# Patient Record
Sex: FEMALE | Employment: PART TIME | URBAN - METROPOLITAN AREA
[De-identification: names, ages, dates, MRNs, and addresses within clinical notes are randomized per-mention and may not be internally consistent; named-entity substitution may affect disease eponyms.]

---

## 2018-06-05 ENCOUNTER — OFFICE VISIT (OUTPATIENT)
Dept: OPTOMETRY | Facility: CLINIC | Age: 53
End: 2018-06-05
Payer: COMMERCIAL

## 2018-06-05 DIAGNOSIS — H52.4 MYOPIA WITH PRESBYOPIA OF BOTH EYES: Primary | ICD-10-CM

## 2018-06-05 DIAGNOSIS — H57.89 REDNESS OF BOTH EYES: Primary | ICD-10-CM

## 2018-06-05 DIAGNOSIS — H52.13 MYOPIA WITH PRESBYOPIA OF BOTH EYES: Primary | ICD-10-CM

## 2018-06-05 PROCEDURE — 92004 COMPRE OPH EXAM NEW PT 1/>: CPT | Mod: S$GLB,,, | Performed by: OPTOMETRIST

## 2018-06-05 PROCEDURE — 92310 CONTACT LENS FITTING OU: CPT | Mod: ,,, | Performed by: OPTOMETRIST

## 2018-06-05 PROCEDURE — 99999 PR PBB SHADOW E&M-EST. PATIENT-LVL II: CPT | Mod: PBBFAC,,, | Performed by: OPTOMETRIST

## 2018-06-05 PROCEDURE — 99499 UNLISTED E&M SERVICE: CPT | Mod: S$GLB,,, | Performed by: OPTOMETRIST

## 2018-06-05 RX ORDER — SIMVASTATIN 20 MG/1
TABLET, FILM COATED ORAL
Refills: 1 | COMMUNITY
Start: 2018-04-29

## 2018-06-05 RX ORDER — UBIDECARENONE 30 MG
30 CAPSULE ORAL 3 TIMES DAILY
COMMUNITY

## 2018-06-05 RX ORDER — ASPIRIN 81 MG/1
81 TABLET ORAL DAILY
COMMUNITY
End: 2019-09-18

## 2018-06-05 RX ORDER — FOLIC ACID 0.8 MG
800 TABLET ORAL DAILY
COMMUNITY
End: 2019-09-18

## 2018-06-05 NOTE — LETTER
June 5, 2018      Ruddy Anderson MD  [address]  [address]  [facility] [address]           [facility]  [address]  [place] [address]  Phone: [phone]  Fax: [phone]          Patient: Manjinder Fischer   MR Number: [id number]   YOB: 1965   Date of Visit: 6/5/2018       Dear Dr. Ruddy Anderson:    Thank you for referring Manjinder Fischer to me for evaluation. Attached you will find relevant portions of my assessment and plan of care.    If you have questions, please do not hesitate to call me. I look forward to following Manjinder Fishcer along with you.    Sincerely,    Massiel Harrell, OD    Enclosure  CC:  No Recipients    If you would like to receive this communication electronically, please contact [email or web address] or [phone] to request more information on [employer] Link access.    For providers and/or their staff who would like to refer a patient to [facility], please contact us through our one-stop-shop provider referral line, [facility], at [phone].    If you feel you have received this communication in error or would no longer like to receive these types of communications, please e-mail [email or web address]

## 2018-06-05 NOTE — PROGRESS NOTES
HPI     Patient's last dilated exam was: apprx 4-5 months ago W/ Dr. Morataya at   [facility].    Was also seen 1/18/18 W/ Dr. Bethea     Pt here for routine eye exam w/ ctl fu. Would like to update her glasses   and contacts. C/O waking up and her eyes are very red.Throughout the day   but worse in the morning.  Has tried otc allergy gtts and At's, has not   used in a while. Denies itching, tearing or FB sensation. Says her eyes do   not feel dry. Patient denies flashes, floaters, pain and double vision.   Has been wearing Acuvue 2 for apprx 10+ years. Has hx of LASIK OU  apprx   20 years ago     Last edited by Mari Bergman, PCT on 6/5/2018 11:06 AM.   (History)            Assessment /Plan     For exam results, see Encounter Report.    Redness of both eyes            1.  Overall eye health normal.  Feel that redness caused by dryness and contact lens material.  Refit patient with monovision Total1 dailies.  Recommend artificial tears at least 2x/day OU.  RTC 1 week for cl check.

## 2018-06-05 NOTE — LETTER
June 5, 2018      Ruddy Anderson MD  [address]  [address]  [facility] [address]           [facility]  [address]  [place] [address]  Phone: [phone]  Fax: [phone]          Patient: Manjinder Fischer   MR Number: [id number]   YOB: 1965   Date of Visit: 6/5/2018       Dear Dr. Ruddy Anderson:    Thank you for referring Manjinder Fischer to me for evaluation. Attached you will find relevant portions of my assessment and plan of care.    If you have questions, please do not hesitate to call me. I look forward to following Manjinder Fischer along with you.    Sincerely,    Massiel Harrell, OD    Enclosure  CC:  No Recipients    If you would like to receive this communication electronically, please contact [email or web address] or [phone] to request more information on [employer] Link access.    For providers and/or their staff who would like to refer a patient to [facility], please contact us through our one-stop-shop provider referral line, [facility] [place], at [phone].    If you feel you have received this communication in error or would no longer like to receive these types of communications, please e-mail [email or web address]

## 2018-06-12 ENCOUNTER — OFFICE VISIT (OUTPATIENT)
Dept: OPTOMETRY | Facility: CLINIC | Age: 53
End: 2018-06-12
Payer: COMMERCIAL

## 2018-06-12 DIAGNOSIS — H52.13 MYOPIA WITH PRESBYOPIA OF BOTH EYES: ICD-10-CM

## 2018-06-12 DIAGNOSIS — H52.4 MYOPIA WITH PRESBYOPIA OF BOTH EYES: ICD-10-CM

## 2018-06-12 DIAGNOSIS — H57.89 REDNESS OF BOTH EYES: Primary | ICD-10-CM

## 2018-06-12 PROCEDURE — 92499 UNLISTED OPH SVC/PROCEDURE: CPT | Mod: ,,, | Performed by: OPTOMETRIST

## 2018-06-12 NOTE — PROGRESS NOTES
HPI     Patient's last eye exam was 6/5/2018 W/ Dr. Harrell    Pt here for ctl fu. Feels she is seeing very hazy for distance with her   contacts. Happens almost instantly, throughout the day, every day. Wears   her lenses between 8-15 hours. Feels the blurriness is due to light, worse   when she is in bright lights. Very happy with her vision for reading.   Patient denies flashes, floaters, pain and double vision.     Last edited by BRADLY Jennings on 6/12/2018  9:57 AM.   (History)            Assessment /Plan     For exam results, see Encounter Report.    Redness of both eyes    Myopia with presbyopia of both eyes            1.  Redness improved since discontinuing use of Acuvue 2s.  2.  Dispensed toric daily to see if this will improve haziness.  Ok for patient to call in preference.

## 2018-06-18 ENCOUNTER — PATIENT MESSAGE (OUTPATIENT)
Dept: OPTOMETRY | Facility: CLINIC | Age: 53
End: 2018-06-18

## 2018-11-14 ENCOUNTER — TELEPHONE (OUTPATIENT)
Dept: OPTOMETRY | Facility: CLINIC | Age: 53
End: 2018-11-14

## 2019-01-02 ENCOUNTER — HOSPITAL ENCOUNTER (EMERGENCY)
Facility: HOSPITAL | Age: 54
Discharge: HOME | End: 2019-01-02
Attending: EMERGENCY MEDICINE
Payer: COMMERCIAL

## 2019-01-02 VITALS
SYSTOLIC BLOOD PRESSURE: 164 MMHG | HEART RATE: 66 BPM | RESPIRATION RATE: 18 BRPM | WEIGHT: 130 LBS | OXYGEN SATURATION: 100 % | TEMPERATURE: 98 F | DIASTOLIC BLOOD PRESSURE: 74 MMHG

## 2019-01-02 DIAGNOSIS — N23 URETERAL COLIC: Primary | ICD-10-CM

## 2019-01-02 LAB
ANION GAP SERPL CALC-SCNC: 12 MMOL/L
B-HCG UR QL: NEGATIVE
BACTERIA #/AREA URNS AUTO: ABNORMAL /HPF
BASOPHILS # BLD AUTO: 0.05 K/UL
BASOPHILS NFR BLD: 0.5 %
BILIRUB UR QL STRIP: NEGATIVE
BUN SERPL-MCNC: 20 MG/DL
CALCIUM SERPL-MCNC: 9.9 MG/DL
CHLORIDE SERPL-SCNC: 105 MMOL/L
CLARITY UR REFRACT.AUTO: ABNORMAL
CO2 SERPL-SCNC: 24 MMOL/L
COLOR UR AUTO: YELLOW
CREAT SERPL-MCNC: 0.8 MG/DL
CTP QC/QA: YES
DIFFERENTIAL METHOD: ABNORMAL
EOSINOPHIL # BLD AUTO: 0.1 K/UL
EOSINOPHIL NFR BLD: 1.1 %
ERYTHROCYTE [DISTWIDTH] IN BLOOD BY AUTOMATED COUNT: 13.8 %
EST. GFR  (AFRICAN AMERICAN): >60 ML/MIN/1.73 M^2
EST. GFR  (NON AFRICAN AMERICAN): >60 ML/MIN/1.73 M^2
GLUCOSE SERPL-MCNC: 137 MG/DL
GLUCOSE UR QL STRIP: NEGATIVE
HCT VFR BLD AUTO: 40.7 %
HGB BLD-MCNC: 13.7 G/DL
HGB UR QL STRIP: ABNORMAL
IMM GRANULOCYTES # BLD AUTO: 0.04 K/UL
IMM GRANULOCYTES NFR BLD AUTO: 0.4 %
KETONES UR QL STRIP: ABNORMAL
LEUKOCYTE ESTERASE UR QL STRIP: NEGATIVE
LYMPHOCYTES # BLD AUTO: 2.2 K/UL
LYMPHOCYTES NFR BLD: 19.7 %
MCH RBC QN AUTO: 30.1 PG
MCHC RBC AUTO-ENTMCNC: 33.7 G/DL
MCV RBC AUTO: 90 FL
MICROSCOPIC COMMENT: ABNORMAL
MONOCYTES # BLD AUTO: 0.8 K/UL
MONOCYTES NFR BLD: 7.5 %
NEUTROPHILS # BLD AUTO: 7.8 K/UL
NEUTROPHILS NFR BLD: 70.8 %
NITRITE UR QL STRIP: NEGATIVE
NRBC BLD-RTO: 0 /100 WBC
PH UR STRIP: 7 [PH] (ref 5–8)
PLATELET # BLD AUTO: 261 K/UL
PMV BLD AUTO: 10.7 FL
POTASSIUM SERPL-SCNC: 3.9 MMOL/L
PROT UR QL STRIP: NEGATIVE
RBC # BLD AUTO: 4.55 M/UL
RBC #/AREA URNS AUTO: >100 /HPF (ref 0–4)
SODIUM SERPL-SCNC: 141 MMOL/L
SP GR UR STRIP: 1.01 (ref 1–1.03)
SQUAMOUS #/AREA URNS AUTO: 1 /HPF
URN SPEC COLLECT METH UR: ABNORMAL
WBC # BLD AUTO: 10.99 K/UL
WBC #/AREA URNS AUTO: 1 /HPF (ref 0–5)

## 2019-01-02 PROCEDURE — 81001 URINALYSIS AUTO W/SCOPE: CPT

## 2019-01-02 PROCEDURE — 99284 EMERGENCY DEPT VISIT MOD MDM: CPT | Mod: 25

## 2019-01-02 PROCEDURE — 80048 BASIC METABOLIC PNL TOTAL CA: CPT

## 2019-01-02 PROCEDURE — 96375 TX/PRO/DX INJ NEW DRUG ADDON: CPT | Mod: 59

## 2019-01-02 PROCEDURE — 96374 THER/PROPH/DIAG INJ IV PUSH: CPT

## 2019-01-02 PROCEDURE — 99284 PR EMERGENCY DEPT VISIT,LEVEL IV: ICD-10-PCS | Mod: ,,, | Performed by: PHYSICIAN ASSISTANT

## 2019-01-02 PROCEDURE — 81025 URINE PREGNANCY TEST: CPT | Performed by: EMERGENCY MEDICINE

## 2019-01-02 PROCEDURE — 85025 COMPLETE CBC W/AUTO DIFF WBC: CPT

## 2019-01-02 PROCEDURE — 96361 HYDRATE IV INFUSION ADD-ON: CPT

## 2019-01-02 PROCEDURE — 99284 EMERGENCY DEPT VISIT MOD MDM: CPT | Mod: ,,, | Performed by: PHYSICIAN ASSISTANT

## 2019-01-02 PROCEDURE — 25000003 PHARM REV CODE 250: Performed by: PHYSICIAN ASSISTANT

## 2019-01-02 PROCEDURE — 63600175 PHARM REV CODE 636 W HCPCS: Performed by: PHYSICIAN ASSISTANT

## 2019-01-02 RX ORDER — ONDANSETRON 4 MG/1
4 TABLET, FILM COATED ORAL EVERY 6 HOURS
Qty: 12 TABLET | Refills: 0 | Status: SHIPPED | OUTPATIENT
Start: 2019-01-02 | End: 2019-09-18

## 2019-01-02 RX ORDER — NAPROXEN 500 MG/1
500 TABLET ORAL 2 TIMES DAILY WITH MEALS
Qty: 20 TABLET | Refills: 0 | Status: SHIPPED | OUTPATIENT
Start: 2019-01-02 | End: 2019-09-18

## 2019-01-02 RX ORDER — ONDANSETRON 2 MG/ML
4 INJECTION INTRAMUSCULAR; INTRAVENOUS
Status: COMPLETED | OUTPATIENT
Start: 2019-01-02 | End: 2019-01-02

## 2019-01-02 RX ORDER — MORPHINE SULFATE 4 MG/ML
4 INJECTION, SOLUTION INTRAMUSCULAR; INTRAVENOUS
Status: COMPLETED | OUTPATIENT
Start: 2019-01-02 | End: 2019-01-02

## 2019-01-02 RX ORDER — KETOROLAC TROMETHAMINE 30 MG/ML
10 INJECTION, SOLUTION INTRAMUSCULAR; INTRAVENOUS
Status: COMPLETED | OUTPATIENT
Start: 2019-01-02 | End: 2019-01-02

## 2019-01-02 RX ORDER — HYDROCODONE BITARTRATE AND ACETAMINOPHEN 5; 325 MG/1; MG/1
1 TABLET ORAL EVERY 4 HOURS PRN
Qty: 12 TABLET | Refills: 0 | Status: SHIPPED | OUTPATIENT
Start: 2019-01-02 | End: 2019-09-18

## 2019-01-02 RX ADMIN — KETOROLAC TROMETHAMINE 10 MG: 30 INJECTION, SOLUTION INTRAMUSCULAR at 04:01

## 2019-01-02 RX ADMIN — SODIUM CHLORIDE 1000 ML: 0.9 INJECTION, SOLUTION INTRAVENOUS at 04:01

## 2019-01-02 RX ADMIN — ONDANSETRON 4 MG: 2 INJECTION INTRAMUSCULAR; INTRAVENOUS at 04:01

## 2019-01-02 RX ADMIN — MORPHINE SULFATE 4 MG: 4 INJECTION INTRAVENOUS at 04:01

## 2019-01-02 NOTE — ED TRIAGE NOTES
Manjinder Fischer, a 53 y.o. female presents to the ED w/ complaint of right sided flank pain. Pt states she awoke from sleep from a pain to the right flank. Pt then started to become restless and nauseas. Pt had an episode of emesis at home. Denies any recent change in urinary habits. Denies hematuria     Triage note:  Chief Complaint   Patient presents with    Flank Pain     Patient reports right sided flank pain that woke her up this am. Reports 2 episodes of vomiting. Denies changes in urinary habits. Denies history of kidney stones.      Review of patient's allergies indicates:   Allergen Reactions    Sulfa (sulfonamide antibiotics) Hives     No past medical history on file.

## 2019-01-02 NOTE — ED PROVIDER NOTES
Encounter Date: 1/2/2019       History     Chief Complaint   Patient presents with    Flank Pain     Patient reports right sided flank pain that woke her up this am. Reports 2 episodes of vomiting. Denies changes in urinary habits. Denies history of kidney stones.      53-year-old female presents to the ER via POV with her  for evaluation of acute onset right lower back pain/ flank pain. Pain awoke the patient from sleep prior to arrival.  She has never had pain like this before.  Pain is localized to the right lower back on the flank.  She endorses nausea with multiple episodes of emesis.  Patient appears moderately uncomfortable in the exam room.  She denies any fevers chills chest pain or shortness of breath.  She denies any changes in her bowels or bladder.          Review of patient's allergies indicates:   Allergen Reactions    Sulfa (sulfonamide antibiotics) Hives     No past medical history on file.  Past Surgical History:   Procedure Laterality Date    REFRACTIVE SURGERY Bilateral 1998     No family history on file.  Social History     Tobacco Use    Smoking status: Never Smoker    Smokeless tobacco: Never Used   Substance Use Topics    Alcohol use: Not on file    Drug use: Not on file     Review of Systems   Constitutional: Negative for fever.   HENT: Negative for sore throat.    Respiratory: Negative for shortness of breath.    Cardiovascular: Negative for chest pain.   Gastrointestinal: Positive for abdominal pain, nausea and vomiting.   Genitourinary: Negative for dysuria.   Musculoskeletal: Negative for back pain.   Skin: Negative for rash.   Neurological: Negative for weakness.   Hematological: Does not bruise/bleed easily.       Physical Exam     Initial Vitals [01/02/19 0440]   BP Pulse Resp Temp SpO2   (!) 130/58 (!) 56 18 98 °F (36.7 °C) 99 %      MAP       --         Physical Exam    Constitutional: Vital signs are normal. She appears well-developed and well-nourished. She appears  distressed.   HENT:   Head: Normocephalic and atraumatic.   Right Ear: External ear normal.   Left Ear: External ear normal.   Mouth/Throat: Oropharynx is clear and moist.   Eyes: Conjunctivae are normal.   Cardiovascular: Normal rate and regular rhythm. Exam reveals no gallop and no friction rub.    No murmur heard.  Pulmonary/Chest: No respiratory distress. She has no wheezes. She has no rhonchi. She has no rales. She exhibits no tenderness.   Abdominal: Soft. Normal appearance and bowel sounds are normal. There is tenderness. There is CVA tenderness.   Musculoskeletal: Normal range of motion.   Neurological: She is alert and oriented to person, place, and time.   Skin: Skin is warm and intact.   Psychiatric: She has a normal mood and affect. Her speech is normal and behavior is normal. Cognition and memory are normal.         ED Course   Procedures  Labs Reviewed   CBC W/ AUTO DIFFERENTIAL   URINALYSIS, REFLEX TO URINE CULTURE   BASIC METABOLIC PANEL          Imaging Results    None          Medical Decision Making:   Differential Diagnosis:   Ureteral colic, pyelonephritis, UTI  ED Management:  53-year-old female with right flank pain  Clots of blood evident on urinalysis, no evidence of infection, kidney function good, CT scan shows a 4 mm stone at the right UVJ.  Patient will be discharged home to follow up with Urology. Pain improved, pt tolerating PO                      Clinical Impression:   The encounter diagnosis was Ureteral colic.      Disposition:   Disposition: Discharged  Condition: Stable                        Varinder Green PA-C  01/02/19 0538

## 2019-01-02 NOTE — DISCHARGE INSTRUCTIONS
Follow-up with Urology  Use nausea medication and pain medication as needed  Return to the ED for any new symptoms    Our goal in the emergency department is to always give you outstanding care and exceptional service. You may receive a survey by mail or e-mail in the next week regarding your experience in our ED. We would greatly appreciate your completing and returning the survey. Your feedback provides us with a way to recognize our staff who give very good care and it helps us learn how to improve when your experience was below our aspiration of excellence.

## 2019-01-02 NOTE — ED NOTES
LOC: The patient is awake, alert, and oriented to place, time, situation. Affect is appropriate.  Speech is appropriate and clear.     APPEARANCE: pt is restless and unable to sit still. Pt complain of severe pain.     SKIN: The skin is warm and dry; color consistent with ethnicity.  Patient has normal skin turgor and moist mucus membranes.  Skin intact; no breakdown or bruising noted.     MUSCULOSKELETAL: Patient moving upper and lower extremities without difficulty.  Denies weakness.     RESPIRATORY: Airway is open and patent. Respirations spontaneous, even, easy, and non-labored.  Patient has a normal effort and rate.  No accessory muscle use noted. Denies cough.     CARDIAC:  Normal rhythm and rate noted.  No peripheral edema noted. No complaints of chest pain.      ABDOMEN: Soft and non tender to palpation.  No distention noted.     NEUROLOGIC: Eyes open spontaneously.  Behavior appropriate to situation.  Follows commands; facial expression symmetrical.  Purposeful motor response noted; normal sensation in all extremities.

## 2019-04-08 ENCOUNTER — TELEPHONE (OUTPATIENT)
Dept: OPTOMETRY | Facility: CLINIC | Age: 54
End: 2019-04-08

## 2019-08-16 ENCOUNTER — TELEPHONE (OUTPATIENT)
Dept: OPTOMETRY | Facility: CLINIC | Age: 54
End: 2019-08-16

## 2019-09-18 ENCOUNTER — OFFICE VISIT (OUTPATIENT)
Dept: OPTOMETRY | Facility: CLINIC | Age: 54
End: 2019-09-18
Payer: COMMERCIAL

## 2019-09-18 DIAGNOSIS — H52.4 MYOPIA WITH PRESBYOPIA OF BOTH EYES: ICD-10-CM

## 2019-09-18 DIAGNOSIS — H04.123 DRY EYE SYNDROME OF BOTH EYES: Primary | ICD-10-CM

## 2019-09-18 DIAGNOSIS — H52.4 MYOPIA WITH PRESBYOPIA OF BOTH EYES: Primary | ICD-10-CM

## 2019-09-18 DIAGNOSIS — H57.89 REDNESS OF BOTH EYES: ICD-10-CM

## 2019-09-18 DIAGNOSIS — H52.13 MYOPIA WITH PRESBYOPIA OF BOTH EYES: Primary | ICD-10-CM

## 2019-09-18 DIAGNOSIS — H52.13 MYOPIA WITH PRESBYOPIA OF BOTH EYES: ICD-10-CM

## 2019-09-18 PROCEDURE — 92310 CONTACT LENS FITTING OU: CPT | Mod: CSM,,, | Performed by: OPTOMETRIST

## 2019-09-18 PROCEDURE — 99499 UNLISTED E&M SERVICE: CPT | Mod: S$GLB,,, | Performed by: OPTOMETRIST

## 2019-09-18 PROCEDURE — 99999 PR PBB SHADOW E&M-EST. PATIENT-LVL II: CPT | Mod: PBBFAC,,, | Performed by: OPTOMETRIST

## 2019-09-18 PROCEDURE — 92014 COMPRE OPH EXAM EST PT 1/>: CPT | Mod: S$GLB,,, | Performed by: OPTOMETRIST

## 2019-09-18 PROCEDURE — 92310 PR CONTACT LENS FITTING (NO CHANGE): ICD-10-PCS | Mod: CSM,,, | Performed by: OPTOMETRIST

## 2019-09-18 PROCEDURE — 99499 NO LOS: ICD-10-PCS | Mod: S$GLB,,, | Performed by: OPTOMETRIST

## 2019-09-18 PROCEDURE — 99999 PR PBB SHADOW E&M-EST. PATIENT-LVL II: ICD-10-PCS | Mod: PBBFAC,,, | Performed by: OPTOMETRIST

## 2019-09-18 PROCEDURE — 92014 PR EYE EXAM, EST PATIENT,COMPREHESV: ICD-10-PCS | Mod: S$GLB,,, | Performed by: OPTOMETRIST

## 2019-09-18 RX ORDER — ROSUVASTATIN CALCIUM 10 MG/1
TABLET, COATED ORAL
Refills: 3 | COMMUNITY
Start: 2019-09-04 | End: 2019-09-18

## 2019-09-18 RX ORDER — PRASTERONE 6.5 MG/1
INSERT VAGINAL
COMMUNITY
Start: 2019-08-23

## 2019-09-18 NOTE — PROGRESS NOTES
HPI     Last eye exam was 6/12/18 with     Pt states her eyes OU are red in the morning, they do not itch or burn   with the redness either. Pt states it has been an ongoing issue for 3-4   years.  Pt states with her dailies contacts after 8 hours it feels like it is time   to take them out. They do not hurt, her eyes just feel tired and contacts   are not sitting still.  Pt does use systane ultra PRN OU, and Blink for contacts.  Pt has had LASIK eye sx.  Pt wants to know if Lumify is okay to use for redness of the eyes.  Patient denies diplopia, headaches, flashes/floaters, and pain.          Last edited by Quita Jacobsen on 9/18/2019  9:19 AM. (History)            Assessment /Plan     For exam results, see Encounter Report.    Dry eye syndrome of both eyes    Redness of both eyes    Myopia with presbyopia of both eyes          1-2.  Patient has tried both Restasis and Xiidra.  Used artificial tears with no improvement.  History of LASIK OU.  Recommend gel drops at night and Lumify as needed.  3.  Patient reports end of day discomfort with current contact.  Will dispense Acuvue Oasays 1 Day for Astigmatism.  Ok for patient to call in preference.  Eye health normal OU.

## 2019-10-04 ENCOUNTER — PATIENT MESSAGE (OUTPATIENT)
Dept: OPTOMETRY | Facility: CLINIC | Age: 54
End: 2019-10-04

## 2020-04-21 DIAGNOSIS — Z01.84 ANTIBODY RESPONSE EXAMINATION: ICD-10-CM

## 2020-04-23 ENCOUNTER — LAB VISIT (OUTPATIENT)
Dept: LAB | Facility: OTHER | Age: 55
End: 2020-04-23
Attending: INTERNAL MEDICINE
Payer: COMMERCIAL

## 2020-04-23 DIAGNOSIS — Z01.84 ANTIBODY RESPONSE EXAMINATION: ICD-10-CM

## 2020-04-23 LAB — SARS-COV-2 IGG SERPL QL IA: NEGATIVE

## 2020-04-23 PROCEDURE — 36415 COLL VENOUS BLD VENIPUNCTURE: CPT

## 2020-04-23 PROCEDURE — 86769 SARS-COV-2 COVID-19 ANTIBODY: CPT

## 2020-10-05 ENCOUNTER — PATIENT MESSAGE (OUTPATIENT)
Dept: OPTOMETRY | Facility: CLINIC | Age: 55
End: 2020-10-05

## 2020-12-23 ENCOUNTER — IMMUNIZATION (OUTPATIENT)
Dept: INTERNAL MEDICINE | Facility: CLINIC | Age: 55
End: 2020-12-23
Payer: COMMERCIAL

## 2020-12-23 DIAGNOSIS — Z23 NEED FOR VACCINATION: ICD-10-CM

## 2020-12-23 PROCEDURE — 91300 COVID-19, MRNA, LNP-S, PF, 30 MCG/0.3 ML DOSE VACCINE: CPT | Mod: ,,, | Performed by: INTERNAL MEDICINE

## 2020-12-23 PROCEDURE — 0001A COVID-19, MRNA, LNP-S, PF, 30 MCG/0.3 ML DOSE VACCINE: ICD-10-PCS | Mod: CV19,,, | Performed by: INTERNAL MEDICINE

## 2020-12-23 PROCEDURE — 0001A COVID-19, MRNA, LNP-S, PF, 30 MCG/0.3 ML DOSE VACCINE: CPT | Mod: CV19,,, | Performed by: INTERNAL MEDICINE

## 2020-12-23 PROCEDURE — 91300 COVID-19, MRNA, LNP-S, PF, 30 MCG/0.3 ML DOSE VACCINE: ICD-10-PCS | Mod: ,,, | Performed by: INTERNAL MEDICINE

## 2021-01-13 ENCOUNTER — IMMUNIZATION (OUTPATIENT)
Dept: INTERNAL MEDICINE | Facility: CLINIC | Age: 56
End: 2021-01-13
Payer: COMMERCIAL

## 2021-01-13 DIAGNOSIS — Z23 NEED FOR VACCINATION: ICD-10-CM

## 2021-01-13 PROCEDURE — 0002A COVID-19, MRNA, LNP-S, PF, 30 MCG/0.3 ML DOSE VACCINE: CPT | Mod: CV19,S$GLB,, | Performed by: INTERNAL MEDICINE

## 2021-01-13 PROCEDURE — 0002A COVID-19, MRNA, LNP-S, PF, 30 MCG/0.3 ML DOSE VACCINE: ICD-10-PCS | Mod: CV19,S$GLB,, | Performed by: INTERNAL MEDICINE

## 2021-01-13 PROCEDURE — 91300 COVID-19, MRNA, LNP-S, PF, 30 MCG/0.3 ML DOSE VACCINE: ICD-10-PCS | Mod: S$GLB,,, | Performed by: INTERNAL MEDICINE

## 2021-01-13 PROCEDURE — 91300 COVID-19, MRNA, LNP-S, PF, 30 MCG/0.3 ML DOSE VACCINE: CPT | Mod: PBBFAC | Performed by: INTERNAL MEDICINE

## 2021-01-14 ENCOUNTER — NURSE TRIAGE (OUTPATIENT)
Dept: ADMINISTRATIVE | Facility: CLINIC | Age: 56
End: 2021-01-14

## 2021-12-08 ENCOUNTER — IMMUNIZATION (OUTPATIENT)
Dept: INTERNAL MEDICINE | Facility: CLINIC | Age: 56
End: 2021-12-08
Payer: COMMERCIAL

## 2021-12-08 DIAGNOSIS — Z23 NEED FOR VACCINATION: Primary | ICD-10-CM

## 2021-12-08 PROCEDURE — 0004A COVID-19, MRNA, LNP-S, PF, 30 MCG/0.3 ML DOSE VACCINE: CPT | Mod: PBBFAC | Performed by: INTERNAL MEDICINE

## 2021-12-23 ENCOUNTER — LAB VISIT (OUTPATIENT)
Dept: PRIMARY CARE CLINIC | Facility: OTHER | Age: 56
End: 2021-12-23
Payer: COMMERCIAL

## 2021-12-23 DIAGNOSIS — J02.9 SORE THROAT: Primary | ICD-10-CM

## 2021-12-23 LAB
CTP QC/QA: YES
SARS-COV-2 AG RESP QL IA.RAPID: NEGATIVE

## 2022-01-05 DIAGNOSIS — R09.82 POST-NASAL DRIP: Primary | ICD-10-CM

## 2022-01-05 DIAGNOSIS — R05.9 COUGH: ICD-10-CM

## 2022-01-05 LAB
CTP QC/QA: YES
SARS-COV-2 AG RESP QL IA.RAPID: NEGATIVE

## 2022-01-05 PROCEDURE — 87811 SARS-COV-2 COVID19 W/OPTIC: CPT

## 2024-08-21 DIAGNOSIS — H57.89 REDNESS OF EYE: Primary | ICD-10-CM

## 2024-08-21 RX ORDER — BRIMONIDINE TARTRATE 0.025 %
1 DROPS OPHTHALMIC (EYE) DAILY PRN
Qty: 2.5 ML | Refills: 12 | Status: SHIPPED | OUTPATIENT
Start: 2024-08-21

## 2025-05-27 ENCOUNTER — OFFICE VISIT (OUTPATIENT)
Dept: OBSTETRICS AND GYNECOLOGY | Facility: CLINIC | Age: 60
End: 2025-05-27
Attending: OBSTETRICS & GYNECOLOGY
Payer: COMMERCIAL

## 2025-05-27 ENCOUNTER — TELEPHONE (OUTPATIENT)
Dept: OBSTETRICS AND GYNECOLOGY | Facility: CLINIC | Age: 60
End: 2025-05-27
Payer: COMMERCIAL

## 2025-05-27 DIAGNOSIS — Z01.419 ENCOUNTER FOR GYNECOLOGICAL EXAMINATION WITHOUT ABNORMAL FINDING: Primary | ICD-10-CM

## 2025-05-27 DIAGNOSIS — N95.2 POSTMENOPAUSAL ATROPHIC VAGINITIS: ICD-10-CM

## 2025-05-27 PROCEDURE — 99999 PR PBB SHADOW E&M-EST. PATIENT-LVL II: CPT | Mod: PBBFAC,,, | Performed by: OBSTETRICS & GYNECOLOGY

## 2025-05-27 PROCEDURE — 99386 PREV VISIT NEW AGE 40-64: CPT | Mod: S$PBB,,, | Performed by: OBSTETRICS & GYNECOLOGY

## 2025-05-27 PROCEDURE — 99212 OFFICE O/P EST SF 10 MIN: CPT | Mod: PBBFAC | Performed by: OBSTETRICS & GYNECOLOGY

## 2025-05-27 RX ORDER — ESTRADIOL 10 UG/1
10 INSERT VAGINAL
Qty: 24 EACH | Refills: 3 | Status: SHIPPED | OUTPATIENT
Start: 2025-05-29

## 2025-05-27 RX ORDER — ROSUVASTATIN CALCIUM 20 MG/1
10 TABLET, COATED ORAL NIGHTLY
COMMUNITY
Start: 2025-02-22

## 2025-05-27 RX ORDER — ESTRADIOL 10 UG/1
10 INSERT VAGINAL DAILY
Qty: 18 EACH | Refills: 0 | Status: SHIPPED | OUTPATIENT
Start: 2025-05-27

## 2025-05-27 RX ORDER — ESCITALOPRAM OXALATE 10 MG/1
10 TABLET ORAL DAILY
COMMUNITY
Start: 2025-03-31

## 2025-05-27 RX ORDER — ESTRADIOL 0.1 MG/G
CREAM VAGINAL DAILY
COMMUNITY
End: 2025-05-27 | Stop reason: ALTCHOICE

## 2025-05-27 RX ORDER — ACYCLOVIR 400 MG/1
400 TABLET ORAL
COMMUNITY

## 2025-05-27 NOTE — PROGRESS NOTES
"  Subjective:       Patient ID: Ryan Fischer is a 60 y.o. female.    Chief Complaint:  Well Woman and Gynecologic Exam      History of Present Illness  Gynecologic Exam  Associated symptoms include urgency. Pertinent negatives include no abdominal pain, back pain or headaches.       Ryan Fischer is a 60 y.o. female  new to me (prior patient of Dr. Warner) here for her annual GYN exam.    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   denies PMB or break through bleeding.   denies vaginal itching or irritation.  Denies vaginal discharge.  She is sexually active.  However, reports dyspareunia for many years, not well relieved with lubricants. Also gets a feeling of "tearing" at the introitus. She has had 1 partner for 35 years .    History of abnormal pap: No  Last Pap: No result found(2024) normal , done at [facility]  Last MMG: normal-2025-routine follow-up in 12 months  Last Dexa: :BMD obtained via the lumbar spine yields a mean T score of -1.4. The lowest value is seen at L2 and L3, -1.5     BMD obtained via each hip yields a mean T score -0.7. The lowest value is seen at the left femoral neck, -1.0   Last Colonoscopy:  NA  denies domestic violence. She does feel safe at home.       Past Surgical History:   Procedure Laterality Date    ARTHROSCOPY, KNEE, WITH LATERAL RETINACULUM RELEASE Left     DILATION AND CURETTAGE OF UTERUS      miscarriage    EVACUATION OF SUBDURAL HEMATOMA      LAPAROSCOPIC RIGHT COLON RESECTION  2015    polyp removed near cecum    REFRACTIVE SURGERY Bilateral      Social History[1]  Family History   Problem Relation Name Age of Onset    Breast cancer Paternal Grandmother  45    Colon cancer Father  75    Heart attack Father      Hypertension Mother      Hypertension Brother      Diabetes Brother      Hypertension Sister      Diabetes " Sister      Breast cancer Paternal Aunt      Diabetes Paternal Uncle      Stroke Neg Hx       OB History          2    Para   2    Term   2            AB        Living   2         SAB        IAB        Ectopic        Multiple        Live Births   2                 LMP 2005 (Approximate)         GYN & OB History  Patient's last menstrual period was 2005 (approximate).       OB History    Para Term  AB Living   2 2 2   2   SAB IAB Ectopic Multiple Live Births       2      # Outcome Date GA Lbr Bob/2nd Weight Sex Type Anes PTL Lv   2 Term      Vag-Spont   MIKE   1 Term      Vag-Spont   MIKE       Review of Systems  Review of Systems   Constitutional:  Negative for activity change, appetite change, fatigue and unexpected weight change.   HENT: Negative.     Eyes:  Negative for visual disturbance.   Respiratory:  Negative for shortness of breath and wheezing.    Cardiovascular:  Negative for chest pain, palpitations and leg swelling.   Gastrointestinal:  Negative for abdominal pain, bloating and blood in stool.   Endocrine: Negative for diabetes and hair loss.   Genitourinary:  Positive for decreased libido, dyspareunia, urgency, vaginal pain and vaginal dryness. Negative for hot flashes.   Musculoskeletal:  Negative for back pain and joint swelling.   Integumentary:  Negative for acne, hair changes and nipple discharge.   Neurological:  Negative for headaches.   Hematological:  Does not bruise/bleed easily.   Psychiatric/Behavioral:  Negative for depression and sleep disturbance. The patient is not nervous/anxious.    Breast: Negative for mastodynia and nipple discharge          Objective:      Physical Exam:   Constitutional: She is oriented to person, place, and time. She appears well-developed and well-nourished.    HENT:   Head: Normocephalic and atraumatic.    Eyes: Pupils are equal, round, and reactive to light. EOM are normal.     Cardiovascular:  Normal rate and regular  rhythm.             Pulmonary/Chest: Effort normal and breath sounds normal.   BREASTS:  no mass, no tenderness, no deformity and no retraction. Right breast exhibits no inverted nipple, no mass, no nipple discharge, no skin change, no tenderness, no bleeding and no swelling. Left breast exhibits no inverted nipple, no mass, no nipple discharge, no skin change, no tenderness, no bleeding and no swelling. Breasts are symmetrical.              Abdominal: Soft. Bowel sounds are normal.     Genitourinary:    Pelvic exam was performed with patient supine.      Genitourinary Comments: PELVIC: Normal external genitalia without lesions.  Resorption of labia minora.  Normal hair distribution.  Adequate perineal body, normal urethral meatus.  Vagina  Dry and poorly rugated, atrophic, without lesions or discharge.  Cervix pink, without lesions, discharge or tenderness.  No significant cystocele or rectocele.  Bimanual exam shows uterus to be normal size, regular, mobile and nontender.  Adnexa without masses or tenderness.    RECTAL:Deferred                 Musculoskeletal: Normal range of motion and moves all extremeties.       Neurological: She is alert and oriented to person, place, and time.    Skin: Skin is warm and dry.    Psychiatric: She has a normal mood and affect.              Assessment:        1. Encounter for gynecological examination without abnormal finding    2. Postmenopausal atrophic vaginitis                Plan:        Problem List Items Addressed This Visit    None  Visit Diagnoses         Encounter for gynecological examination without abnormal finding    -  Primary      Postmenopausal atrophic vaginitis        Relevant Medications    estradioL (IMVEXXY STARTER PACK) 10 mcg InPk    estradioL (IMVEXXY MAINTENANCE PACK) 10 mcg Inst (Start on 5/29/2025)          COUNSELING:  The patient was counseled today on regular weight bearing exercise. Patient was counseled today on the new ACS guidelines for cervical  cytology screening as well as the current recommendations for breast cancer screening. She was counseled to follow up with her PCP for other routine health maintenance. Counseling session lasted approximately 10 minutes, and all her questions were answered.  For women over the age of 65, you can stop having cervical cancer screenings if you have never had abnormal cervical cells or cervical cancer, and youve had three negative Pap tests in a row. (You also can stop screening if youve had two negative Pap and HPV tests in a row in the past 10 years, with at least one test in the past 5 years.),    As of April 1, 2021, the Cures Act has been passed nationally. This new law requires that all doctors progress notes, lab results, pathology reports and radiology reports be released IMMEDIATELY to the patient in the patient portal. That means that the results are released to you at the EXACT same time they are released to me. Therefore, with all of the patients that I have I am not able to reply to each patient exactly when the results come in. So there will be a delay from when you see the results to when I see them and have time to come up with a response to send you. Also I only see these results when I am on the computer at work. So if the results come in over the weekend or after 5 pm of a work day, I will not see them until the next business day. As you can tell, this is a challenge as a provider to give every patient the quick response they hope for and deserve. So please be patient!      Thanks for your understanding and patience.    Follow up in about 3 months (around 8/27/2025), or if symptoms worsen or fail to improve.            [1]   Social History  Socioeconomic History    Marital status:    Tobacco Use    Smoking status: Never    Smokeless tobacco: Never   Substance and Sexual Activity    Alcohol use: Not Currently     Alcohol/week: 1.0 standard drink of alcohol     Types: 1 Glasses of wine per week     Drug use: No    Sexual activity: Yes     Partners: Male     Comment: , together x 35 years

## 2025-07-03 DIAGNOSIS — N95.2 POSTMENOPAUSAL ATROPHIC VAGINITIS: ICD-10-CM

## 2025-07-03 RX ORDER — ESTRADIOL 10 UG/1
10 INSERT VAGINAL
Qty: 24 EACH | Refills: 3 | OUTPATIENT
Start: 2025-07-03

## 2025-07-03 NOTE — TELEPHONE ENCOUNTER
Refill Decision Note   Ryan Fischer  is requesting a refill authorization.    Brief Assessment and Rationale for Refill:   Quick Discontinue       Medication Therapy Plan:   Duplicate      Comments:     Note composed:12:52 PM 07/03/2025

## 2025-07-03 NOTE — TELEPHONE ENCOUNTER
"Source  Ryan Fischer "VIRGIL" (Patient)     Subject  Ryan Fischer "VIRGIL" (Patient)     Topic  Medications - Medication Refill    Summary  Medication refill request  Communication  Type: RX Refill Request        Who Called: self        Have you contacted your pharmacy: yes        Refill or New Rx: refill        RX Name and Strength:IMVEXXY        Preferred Pharmacy with phone number:    [facility] [address]    [address] [address] [place] [address]    Phone: [phone] Fax: [phone]        Local or Mail Order: mail        Would the patient rather a call back or a response via [facility]? Call back        Best Call Back Number: [phone]      "

## 2025-07-03 NOTE — TELEPHONE ENCOUNTER
Refill Routing Note   Medication(s) are not appropriate for processing by [facility] for the following reason(s):        New or recently adjusted medication    [facility] action(s):  Defer               Appointments  past 12m or future 3m with PCP    Date Provider   Last Visit   5/27/2025 Macie Dunn MD   Next Visit   8/26/2025 Macie Dunn MD   ED visits in past 90 days: 0        Note composed:9:12 AM 07/03/2025

## 2025-07-08 RX ORDER — ESTRADIOL 10 UG/1
INSERT VAGINAL
Qty: 18 EACH | Refills: 0 | OUTPATIENT
Start: 2025-07-08

## 2025-07-23 ENCOUNTER — TELEPHONE (OUTPATIENT)
Facility: CLINIC | Age: 60
End: 2025-07-23
Payer: COMMERCIAL

## 2025-07-23 DIAGNOSIS — Z12.31 SCREENING MAMMOGRAM, ENCOUNTER FOR: Primary | ICD-10-CM

## 2025-07-23 NOTE — TELEPHONE ENCOUNTER
Called patient I'm reference to her message and to inform that her mammogram requisition would be faxed to Agnieszka Avila [phone] as requested. Patient said thanks.

## 2025-08-14 ENCOUNTER — OFFICE VISIT (OUTPATIENT)
Dept: OPTOMETRY | Facility: CLINIC | Age: 60
End: 2025-08-14
Payer: COMMERCIAL

## 2025-08-14 DIAGNOSIS — Z97.3 WEARS CONTACT LENSES: ICD-10-CM

## 2025-08-14 DIAGNOSIS — Z46.0 FITTING AND ADJUSTMENT OF SPECTACLES AND CONTACT LENSES: Primary | ICD-10-CM

## 2025-08-14 DIAGNOSIS — H25.13 NUCLEAR SCLEROSIS OF BOTH EYES: Primary | ICD-10-CM

## 2025-08-14 PROCEDURE — 92004 COMPRE OPH EXAM NEW PT 1/>: CPT | Mod: ,,, | Performed by: OPTOMETRIST

## 2025-08-14 PROCEDURE — 99499 UNLISTED E&M SERVICE: CPT | Mod: S$GLB,,, | Performed by: OPTOMETRIST

## 2025-08-14 PROCEDURE — 99999 PR PBB SHADOW E&M-EST. PATIENT-LVL II: CPT | Mod: PBBFAC,,, | Performed by: OPTOMETRIST

## 2025-08-14 PROCEDURE — 92015 DETERMINE REFRACTIVE STATE: CPT | Mod: ,,, | Performed by: OPTOMETRIST

## 2025-08-14 RX ORDER — FENOFIBRATE 160 MG/1
TABLET ORAL
COMMUNITY
Start: 2025-06-05

## 2025-08-19 ENCOUNTER — PATIENT MESSAGE (OUTPATIENT)
Dept: OPTOMETRY | Facility: CLINIC | Age: 60
End: 2025-08-19
Payer: COMMERCIAL

## 2025-08-26 ENCOUNTER — OFFICE VISIT (OUTPATIENT)
Dept: OBSTETRICS AND GYNECOLOGY | Facility: CLINIC | Age: 60
End: 2025-08-26
Attending: OBSTETRICS & GYNECOLOGY
Payer: COMMERCIAL

## 2025-08-26 VITALS
BODY MASS INDEX: 24.16 KG/M2 | HEIGHT: 65 IN | DIASTOLIC BLOOD PRESSURE: 69 MMHG | HEART RATE: 63 BPM | WEIGHT: 145 LBS | SYSTOLIC BLOOD PRESSURE: 102 MMHG

## 2025-08-26 DIAGNOSIS — N39.3 STRESS INCONTINENCE OF URINE: ICD-10-CM

## 2025-08-26 DIAGNOSIS — N95.2 POSTMENOPAUSAL ATROPHIC VAGINITIS: ICD-10-CM

## 2025-08-26 DIAGNOSIS — Z78.0 MENOPAUSE PRESENT: ICD-10-CM

## 2025-08-26 DIAGNOSIS — N94.10 FEMALE DYSPAREUNIA: Primary | ICD-10-CM

## 2025-08-26 PROCEDURE — 99999 PR PBB SHADOW E&M-EST. PATIENT-LVL IV: CPT | Mod: PBBFAC,,, | Performed by: OBSTETRICS & GYNECOLOGY

## 2025-08-26 PROCEDURE — 99214 OFFICE O/P EST MOD 30 MIN: CPT | Mod: S$GLB,,, | Performed by: OBSTETRICS & GYNECOLOGY

## 2025-08-26 PROCEDURE — 3078F DIAST BP <80 MM HG: CPT | Mod: CPTII,S$GLB,, | Performed by: OBSTETRICS & GYNECOLOGY

## 2025-08-26 PROCEDURE — 3008F BODY MASS INDEX DOCD: CPT | Mod: CPTII,S$GLB,, | Performed by: OBSTETRICS & GYNECOLOGY

## 2025-08-26 PROCEDURE — 1159F MED LIST DOCD IN RCRD: CPT | Mod: CPTII,S$GLB,, | Performed by: OBSTETRICS & GYNECOLOGY

## 2025-08-26 PROCEDURE — 3074F SYST BP LT 130 MM HG: CPT | Mod: CPTII,S$GLB,, | Performed by: OBSTETRICS & GYNECOLOGY

## 2025-08-26 RX ORDER — ESTRADIOL 10 UG/1
10 INSERT VAGINAL
Qty: 24 EACH | Refills: 3 | Status: SHIPPED | OUTPATIENT
Start: 2025-08-28

## 2025-08-26 RX ORDER — PROGESTERONE 100 MG/1
100 CAPSULE ORAL NIGHTLY
Qty: 30 CAPSULE | Refills: 6 | Status: SHIPPED | OUTPATIENT
Start: 2025-08-26 | End: 2026-08-26

## 2025-08-27 ENCOUNTER — PATIENT MESSAGE (OUTPATIENT)
Dept: OPTOMETRY | Facility: CLINIC | Age: 60
End: 2025-08-27
Payer: COMMERCIAL